# Patient Record
(demographics unavailable — no encounter records)

---

## 2024-12-20 NOTE — DISCUSSION/SUMMARY
[de-identified] : 16f s/p fall with right shoulder contusion and trapezius spasm, and scapular tenderness on exam  The patient was advised of the diagnosis. The natural history of the pathology was explained in full to the patient in layman's terms. All questions were answered. The risks and benefits of surgical and non-surgical treatment alternatives were explained in full to the patient.   1) start physical therapy 2) cryotherapy, rest and activity modification 3) rtc 3 weeks for re-eval   Entered by Joleen Celis acting as scribe. Dr. Medina- The documentation recorded by the scribe accurately reflects the service I personally performed and the decisions made by me.

## 2024-12-20 NOTE — HISTORY OF PRESENT ILLNESS
[5] : 5 [2] : 2 [Dull/Aching] : dull/aching [Sharp] : sharp [de-identified] : 12/19/2024 Ms. YUE TAY, a 16-year-old female, ([facility]) presents with father in exam room for R shoulder pain. Reports fall off balance beam during school gymnastics on 12/12/24, landing directly onto the right shoulder, was able to complete her routine. Now experiencing sharp pain and clicking with activity and aching pain at rest. Applying ice.  Reports hx clavicle fx.  [] : no [FreeTextEntry1] : R shoulder  [de-identified] : [employer] [de-identified] : 10th grade

## 2024-12-20 NOTE — DISCUSSION/SUMMARY
[de-identified] : 16f s/p fall with right shoulder contusion and trapezius spasm, and scapular tenderness on exam  The patient was advised of the diagnosis. The natural history of the pathology was explained in full to the patient in layman's terms. All questions were answered. The risks and benefits of surgical and non-surgical treatment alternatives were explained in full to the patient.   1) start physical therapy 2) cryotherapy, rest and activity modification 3) rtc 3 weeks for re-eval   Entered by Joleen Celis acting as scribe. Dr. Medina- The documentation recorded by the scribe accurately reflects the service I personally performed and the decisions made by me.

## 2024-12-20 NOTE — HISTORY OF PRESENT ILLNESS
[5] : 5 [2] : 2 [Dull/Aching] : dull/aching [Sharp] : sharp [de-identified] : 12/19/2024 Ms. YUE TAY, a 16-year-old female, ([facility]) presents with father in exam room for R shoulder pain. Reports fall off balance beam during school gymnastics on 12/12/24, landing directly onto the right shoulder, was able to complete her routine. Now experiencing sharp pain and clicking with activity and aching pain at rest. Applying ice.  Reports hx clavicle fx.  [] : no [FreeTextEntry1] : R shoulder  [de-identified] : [employer] [de-identified] : 10th grade

## 2025-01-02 NOTE — DISCUSSION/SUMMARY
[de-identified] : 16f s/p fall with right shoulder contusion and trapezius spasm, and scapular dyskinesia   The patient was advised of the diagnosis. The natural history of the pathology was explained in full to the patient in layman's terms. All questions were answered. The risks and benefits of surgical and non-surgical treatment alternatives were explained in full to the patient.   1) start physical therapy 2) cryotherapy, rest and activity modification 3) rtc 3 weeks for re-eval   Entered by Joleen Celis acting as scribe. Dr. Medina- The documentation recorded by the scribe accurately reflects the service I personally performed and the decisions made by me.

## 2025-01-02 NOTE — PHYSICAL EXAM
[NL (0-70)] : full internal rotation 0-70 degrees [] : motor and sensory intact distally [TWNoteComboBox7] : active forward flexion 160 degrees [TWNoteComboBox4] : passive forward flexion 170 degrees [de-identified] : active abduction 145 degrees [TWNoteComboBox9] : passive abduction 160 degrees [de-identified] : external rotation at 90 degrees of abduction 90 degrees

## 2025-01-02 NOTE — HISTORY OF PRESENT ILLNESS
[0] : 0 [Dull/Aching] : dull/aching [Sharp] : sharp [de-identified] : 1/2/25: Here for R shoulder. Reports no pain, has not attended PT but has stopped gymnastics  12/19/2024 Ms. YUE TAY, a 16-year-old female, ([facility]) presents with father in exam room for R shoulder pain. Reports fall off balance beam during school gymnastics on 12/12/24, landing directly onto the right shoulder, was able to complete her routine. Now experiencing sharp pain and clicking with activity and aching pain at rest. Applying ice.  Reports hx clavicle fx.  [] : no [FreeTextEntry1] : R shoulder  [de-identified] : [employer] [de-identified] : 10th grade

## 2025-01-10 NOTE — HISTORY OF PRESENT ILLNESS
[0] : 0 [Dull/Aching] : dull/aching [Sharp] : sharp [de-identified] : 1/9/25: Here for f/u R shoulder. Attending PT 2x/wk. reports 70% improvement.  1/2/25: Here for R shoulder. Reports no pain, has not attended PT but has stopped gymnastics  12/19/2024 Ms. YUE TAY, a 16-year-old female, ([facility]) presents with father in exam room for R shoulder pain. Reports fall off balance beam during school gymnastics on 12/12/24, landing directly onto the right shoulder, was able to complete her routine. Now experiencing sharp pain and clicking with activity and aching pain at rest. Applying ice.  Reports hx clavicle fx.  [] : no [FreeTextEntry1] : R shoulder  [de-identified] : [employer] [de-identified] : 10th grade

## 2025-01-10 NOTE — PHYSICAL EXAM
[NL (0-70)] : full internal rotation 0-70 degrees [] : motor and sensory intact distally [FreeTextEntry9] : contralateral  [TWNoteComboBox7] : active forward flexion 160 degrees [TWNoteComboBox4] : passive forward flexion 170 degrees [de-identified] : active abduction 170 degrees [TWNoteComboBox9] : passive abduction 160 degrees [de-identified] : external rotation at 90 degrees of abduction 90 degrees

## 2025-01-10 NOTE — DISCUSSION/SUMMARY
[de-identified] : 16f s/p fall with right shoulder contusion and trapezius spasm, and scapular dyskinesia   The patient was advised of the diagnosis. The natural history of the pathology was explained in full to the patient in layman's terms. All questions were answered. The risks and benefits of surgical and non-surgical treatment alternatives were explained in full to the patient.   1) c/w physical therapy and hep  2) cryotherapy, rest and activity modification 3) rtc 1 week for re-eval   Entered by Joleen Celis acting as scribe. Dr. Medina- The documentation recorded by the scribe accurately reflects the service I personally performed and the decisions made by me.

## 2025-01-10 NOTE — HISTORY OF PRESENT ILLNESS
[0] : 0 [Dull/Aching] : dull/aching [Sharp] : sharp [de-identified] : 1/9/25: Here for f/u R shoulder. Attending PT 2x/wk. reports 70% improvement.  1/2/25: Here for R shoulder. Reports no pain, has not attended PT but has stopped gymnastics  12/19/2024 Ms. YUE TAY, a 16-year-old female, ([facility]) presents with father in exam room for R shoulder pain. Reports fall off balance beam during school gymnastics on 12/12/24, landing directly onto the right shoulder, was able to complete her routine. Now experiencing sharp pain and clicking with activity and aching pain at rest. Applying ice.  Reports hx clavicle fx.  [] : no [FreeTextEntry1] : R shoulder  [de-identified] : [employer] [de-identified] : 10th grade

## 2025-01-10 NOTE — DISCUSSION/SUMMARY
[de-identified] : 16f s/p fall with right shoulder contusion and trapezius spasm, and scapular dyskinesia   The patient was advised of the diagnosis. The natural history of the pathology was explained in full to the patient in layman's terms. All questions were answered. The risks and benefits of surgical and non-surgical treatment alternatives were explained in full to the patient.   1) c/w physical therapy and hep  2) cryotherapy, rest and activity modification 3) rtc 1 week for re-eval   Entered by Joleen Celis acting as scribe. Dr. Medina- The documentation recorded by the scribe accurately reflects the service I personally performed and the decisions made by me.

## 2025-01-10 NOTE — PHYSICAL EXAM
[NL (0-70)] : full internal rotation 0-70 degrees [] : motor and sensory intact distally [FreeTextEntry9] : contralateral  [TWNoteComboBox7] : active forward flexion 160 degrees [TWNoteComboBox4] : passive forward flexion 170 degrees [de-identified] : active abduction 170 degrees [TWNoteComboBox9] : passive abduction 160 degrees [de-identified] : external rotation at 90 degrees of abduction 90 degrees

## 2025-01-16 NOTE — PHYSICAL EXAM
[NL (0-70)] : full internal rotation 0-70 degrees [Right] : right shoulder [] : no atrophy [NL (0-180)] : full active abduction 0-180 degrees [FreeTextEntry9] : contralateral  [TWNoteComboBox7] : active forward flexion 170 degrees [de-identified] : external rotation at 90 degrees of abduction 90 degrees

## 2025-01-16 NOTE — HISTORY OF PRESENT ILLNESS
[0] : 0 [Dull/Aching] : dull/aching [Sharp] : sharp [de-identified] : 1/15/25: Here to f/u R shoulder. Attending PT, going well.  1/9/25: Here for f/u R shoulder. Attending PT 2x/wk. reports 70% improvement.  1/2/25: Here for R shoulder. Reports no pain, has not attended PT but has stopped gymnastics  12/19/2024 Ms. YUE TAY, a 16-year-old female, ([facility]) presents with father in exam room for R shoulder pain. Reports fall off balance beam during school gymnastics on 12/12/24, landing directly onto the right shoulder, was able to complete her routine. Now experiencing sharp pain and clicking with activity and aching pain at rest. Applying ice.  Reports hx clavicle fx.  [] : no [FreeTextEntry1] : R shoulder  [de-identified] : [employer] [de-identified] : 10th grade

## 2025-01-16 NOTE — DISCUSSION/SUMMARY
[de-identified] : 16f s/p fall with right shoulder contusion and trapezius spasm, and scapular dyskinesia   The patient was advised of the diagnosis. The natural history of the pathology was explained in full to the patient in layman's terms. All questions were answered. The risks and benefits of surgical and non-surgical treatment alternatives were explained in full to the patient.   1) c/w physical therapy and hep  2) cryotherapy, rest and activity modification 3) clear to return to gym and sports as tolerated 4) rtc 1 week for re-eval   Entered by Joleen Celis acting as scribe. Dr. Medina- The documentation recorded by the scribe accurately reflects the service I personally performed and the decisions made by me.